# Patient Record
Sex: MALE | Race: WHITE
[De-identification: names, ages, dates, MRNs, and addresses within clinical notes are randomized per-mention and may not be internally consistent; named-entity substitution may affect disease eponyms.]

---

## 2020-01-17 ENCOUNTER — HOSPITAL ENCOUNTER (OUTPATIENT)
Dept: HOSPITAL 38 - CC.SDS | Age: 65
End: 2020-01-17
Attending: FAMILY MEDICINE
Payer: COMMERCIAL

## 2020-01-17 DIAGNOSIS — M10.9: ICD-10-CM

## 2020-01-17 DIAGNOSIS — Z88.5: ICD-10-CM

## 2020-01-17 DIAGNOSIS — F41.8: ICD-10-CM

## 2020-01-17 DIAGNOSIS — Z86.010: ICD-10-CM

## 2020-01-17 DIAGNOSIS — N40.0: ICD-10-CM

## 2020-01-17 DIAGNOSIS — K52.9: ICD-10-CM

## 2020-01-17 DIAGNOSIS — Z79.899: ICD-10-CM

## 2020-01-17 DIAGNOSIS — K57.31: Primary | ICD-10-CM

## 2020-01-17 DIAGNOSIS — Z79.2: ICD-10-CM

## 2020-01-17 DIAGNOSIS — K62.89: ICD-10-CM

## 2020-01-17 NOTE — OR
DATE OF OPERATION:  01/17/2020

 

PREOPERATIVE DIAGNOSIS:

1. HEMATOCHEZIA.

2. HISTORY OF POLYPS.

 

POSTOPERATIVE DIAGNOSIS:  HEMATOCHEZIA.

 

SURGEON:  Dami Ramírez MD

 

PROCEDURE:  DIAGNOSTIC COLONOSCOPY WITH BIOPSIES X3.

 

ANESTHESIA:  MAC.

 

COMPLICATIONS:  None.

 

SPECIMEN:

1. Rectosigmoid biopsy x2.

2. Distal rectal biopsy x1.

 

FINDINGS:

1. Full-length colonoscopy.

2. Moderate distal sigmoid and rectosigmoid diverticulosis.

3. Rectosigmoid colitis, likely diverticular in nature.

4. Distal proctitis.

 

RECOMMENDATIONS:  Followup pending path reports.  Routine colonoscopy in 10

years.

 

INDICATIONS:  The patient has a prior history of polyps being removed. He had a

4-year followup.  This was approximately 7 years later.  He has been having some

hematochezia.  He was sent for a diagnostic scope.

 

DESCRIPTION OF PROCEDURE:  The patient was prepped and draped, placed in the

left lateral decubitus position.  A lubricated Olympus colonoscope was inserted

and easily advanced to the cecum.  Direct visualization of the ileocecal valve

and appendiceal orifice was accomplished.  The bowel prep was excellent.  Upon

withdrawal of the scope, the cecum, right transverse and descending colons were

completely benign.  The patient had diverticular disease throughout most of the

sigmoid and into the rectosigmoid junction,  moderate in severity down at the

junction region.  In that area, the patient had some peridiverticular

inflammation and colitis.  We did do 2 biopsies of it.  The rectal vault was

essentially benign, other than on retroflexion, he had a lot of perianal

proctitis present.  We did do a biopsy of that as

well.  No other hemorrhoidal lesions.  Air was then suctioned, scope removed

without complication.

 

JEREMIAH/DAYSI

DD:  01/17/2020 09:12:27

DT:  01/17/2020 12:32:40

Job #:  [id number]